# Patient Record
Sex: FEMALE | Race: WHITE
[De-identification: names, ages, dates, MRNs, and addresses within clinical notes are randomized per-mention and may not be internally consistent; named-entity substitution may affect disease eponyms.]

---

## 2017-02-09 ENCOUNTER — HOSPITAL ENCOUNTER (EMERGENCY)
Dept: HOSPITAL 25 - UCCORT | Age: 67
Discharge: HOME | End: 2017-02-09
Payer: COMMERCIAL

## 2017-02-09 VITALS — SYSTOLIC BLOOD PRESSURE: 144 MMHG | DIASTOLIC BLOOD PRESSURE: 72 MMHG

## 2017-02-09 DIAGNOSIS — Z88.5: ICD-10-CM

## 2017-02-09 DIAGNOSIS — Z98.84: ICD-10-CM

## 2017-02-09 DIAGNOSIS — Z88.8: ICD-10-CM

## 2017-02-09 DIAGNOSIS — Z96.653: ICD-10-CM

## 2017-02-09 DIAGNOSIS — Z87.891: ICD-10-CM

## 2017-02-09 DIAGNOSIS — M19.011: ICD-10-CM
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
## 2017-02-09 DIAGNOSIS — M85.88: ICD-10-CM
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
## 2017-02-09 DIAGNOSIS — S46.911A: Primary | ICD-10-CM

## 2017-02-09 PROCEDURE — 72070 X-RAY EXAM THORAC SPINE 2VWS: CPT

## 2017-02-09 PROCEDURE — G0463 HOSPITAL OUTPT CLINIC VISIT: HCPCS

## 2017-02-09 PROCEDURE — 99212 OFFICE O/P EST SF 10 MIN: CPT

## 2017-02-09 NOTE — RAD
HISTORY: Fall, pain, shoulder pain



COMPARISONS: None



VIEWS: 2, frontal internal rotation and outlet views of the right shoulder    



FINDINGS:



BONE DENSITY: Normal.

BONES: There is no displaced fracture.    

JOINTS: There is no arthropathy.    

ALIGNMENT: There is no dislocation. 

SOFT TISSUES: Unremarkable.



OTHER FINDINGS: None.



IMPRESSION: 

NO ACUTE OSSEOUS INJURY. IF SYMPTOMS PERSIST, RECOMMEND REPEAT IMAGING.

## 2017-02-09 NOTE — UC
Upper Extremity HPI





- HPI Summary


HPI Summary: 





right shoulder pain after a fall last pm at home.  States she got dizzy from 

feeling ill.  Did not hit her head.  No LOC.  No neck pain.  States she used a 

lidocaine patch and took tramadol prior to coming, that she takes for chronic 

low back pain.  States she has had a fusion of C6-T2.  





- History of Current Complaint


Chief Complaint: UCUpperExtremity


Stated Complaint: SHOULDER INJURY


Time Seen by Provider: 02/09/17 13:35


Hx Obtained From: Patient


Hx Last Menstrual Period: n/a


Onset/Duration: Sudden Onset, Lasting Hours, Still Present


Severity Initially: Moderate


Severity Currently: Moderate


Pain Intensity: 9


Pain Scale Used: 0-10 Numeric


Location Of Pain: Is Discrete @ - right shoulder, clavicle, scapula


Character: Sharp


Aggravating Factor(s): Movement


Alleviating Factor(s): Nothing


Associated Signs And Symptoms: Positive: Negative.  Negative: Bruising, Numbness

/Tingling


Related History: Dominant Hand Right





- Risk Factors


Non-Orthopedic Risk Factor: Negative


DVT Risk Factors: Negative


Septic Arthritis Risk Factor: Negative





- Allergies/Home Medications


Allergies/Adverse Reactions: 


 Allergies











Allergy/AdvReac Type Severity Reaction Status Date / Time


 


Morphine Allergy  Rash Verified 02/09/17 13:01


 


Propoxyphene Allergy  Tachycardia Verified 02/09/17 13:01





[From Darvocet-N]     


 


Hydrocodone AdvReac  See Comment Verified 02/09/17 13:01


 


Rosiglitazone [From Avandia] AdvReac  Diarrhea Verified 02/09/17 13:01














PMH/Surg Hx/FS Hx/Imm Hx


Previously Healthy: No - osteoporosis


Endocrine History Of: Reports: Diabetes


   Denies: Thyroid Disease


Cardiovascular History Of: 


   Denies: Cardiac Disorders, Hypertension


Respiratory History Of: 


   Denies: COPD, Asthma


GI/ History Of: 


   Denies: Ulcer





- Surgical History


Surgical History: Yes


Surgery Procedure, Year, and Place: b/l knee replacements 1 each, hysterectomy, 

spinal fusion x 4, left ankle; gastric bypass 2012,.  C-2 to T-2 fusion--2016, 

left shoulder sx--2016.





- Family History


Known Family History: Positive: Hypertension





- Social History


Alcohol Use: None


Substance Use Type: None


Smoking Status (MU): Former Smoker


Type: Cigarettes


Amount Used/How Often: 1 PPD


Length of Time of Smoking/Using Tobacco: On and Off 40 Years


Have You Smoked in the Last Year: No


When Did the Patient Quit Smoking/Using Tobacco: ~2003





- Immunization History


Most Recent Influenza Vaccination: 2016





Review of Systems


Constitutional: Negative


Skin: Negative


Eyes: Negative


ENT: Negative


Respiratory: Negative


Cardiovascular: Negative


Gastrointestinal: Negative


Genitourinary: Negative


Motor: Negative


Neurovascular: Negative


Musculoskeletal: Arthralgia


Neurological: Negative


Psychological: Negative


All Other Systems Reviewed And Are Negative: Yes





Physical Exam


Triage Information Reviewed: Yes


Appearance: Well-Appearing, Pain Distress, Thin


Vital Signs: 


 Initial Vital Signs











Temp  98 F   02/09/17 13:03


 


Pulse  87   02/09/17 13:03


 


Resp  20   02/09/17 13:03


 


BP  144/72   02/09/17 13:03


 


Pulse Ox  100   02/09/17 13:03











Vital Signs Reviewed: Yes


Eyes: Positive: Conjunctiva Clear


ENT: Positive: Normal ENT inspection


Dental Exam: Normal


Neck: Positive: Supple, Nontender


Respiratory: Positive: Chest non-tender, Lungs clear, Normal breath sounds, No 

respiratory distress


Cardiovascular: Positive: RRR, No Murmur, Pulses Normal, Brisk Capillary Refill


Musculoskeletal: Positive: Strength Intact, ROM Limited @ - right shoulder, 

pain on palpation right interscapular area, upper thoracic spine.  Axillary 

nerve intact.  Tender AC joint


Neurological: Positive: Alert, Muscle Tone Normal


Psychological Exam: Normal


Skin Exam: Normal





Upper Extremity Course/Dx





- Course


Course Of Treatment: xrays thoracic spine, scapula and right shoulder all neg 

for fx





- Differential Dx/Diagnosis


Provider Diagnoses: right shoulder strain s/p fall.  osteopenia





Discharge





- Discharge Plan


Condition: Stable


Disposition: HOME


Patient Education Materials:  Shoulder Sprain (ED)


Referrals: 


Yeni Dumas MD [Primary Care Provider] -

## 2017-02-09 NOTE — RAD
Indication: Back pain after fall.



2 views of the thoracic spine are reviewed. There is normal vertebral body height and

alignment. Diffuse osteopenia is noted. Disc spaces all well-preserved.



IMPRESSION: Diffuse osteopenia without fracture of the thoracic spine.

## 2017-02-09 NOTE — RAD
Indication: Right shoulder injury.



3 views of the right shoulder demonstrates AC joint arthritis. Degenerative changes of the

right glenohumeral joint is noted. No fracture is identified.



IMPRESSION: AC JOINT ARTHRITIS WITH NO EVIDENCE OF FRACTURE.

## 2017-09-04 ENCOUNTER — HOSPITAL ENCOUNTER (EMERGENCY)
Dept: HOSPITAL 25 - UCCORT | Age: 67
Discharge: HOME | End: 2017-09-04
Payer: COMMERCIAL

## 2017-09-04 VITALS — SYSTOLIC BLOOD PRESSURE: 122 MMHG | DIASTOLIC BLOOD PRESSURE: 56 MMHG

## 2017-09-04 DIAGNOSIS — R05: Primary | ICD-10-CM

## 2017-09-04 PROCEDURE — 99211 OFF/OP EST MAY X REQ PHY/QHP: CPT

## 2017-09-04 PROCEDURE — G0463 HOSPITAL OUTPT CLINIC VISIT: HCPCS

## 2017-09-04 NOTE — UC
Respiratory Complaint HPI





- HPI Summary


HPI Summary: 





pt c/o of "burning in chest" with respirations.  pt reports she had a URI last 

week that now has "traveled to her lungs" .  Pt c/o SOB with ambulation. 





- History of Current Complaint


Chief Complaint: UCGeneralIllness


Stated Complaint: CHEST CONGESTION, COUGH


Time Seen by Provider: 09/04/17 12:14


Hx Obtained From: Patient


Hx Last Menstrual Period: n/a


Pregnant?: No


Onset/Duration: Gradual Onset, Lasting Days


Severity Initially: Mild


Severity Currently: Mild


Character: Cough: Nonproductive


Aggravating Factors: Deep Breaths


Alleviating Factors: Spontaneous Resolution


Associated Signs And Symptoms: Positive: Dyspnea, URI





- Risk Factors


Pulmonary Embolism Risk Factors: Negative


Cardiac Risk Factors: Negative


Pseudomonas Risk Factors: Negative


Tuberculosis Risk Factors: Negative





- Allergies/Home Medications


Allergies/Adverse Reactions: 


 Allergies











Allergy/AdvReac Type Severity Reaction Status Date / Time


 


Morphine Allergy  Rash Verified 02/09/17 13:01


 


Propoxyphene Allergy  Tachycardia Verified 02/09/17 13:01





[From Darvocet-N]     


 


Hydrocodone AdvReac  See Comment Verified 02/09/17 13:01


 


Rosiglitazone [From Avandia] AdvReac  Diarrhea Verified 02/09/17 13:01











Home Medications: 


 Home Medications





Albuterol Sulfate [Proventil Hfa] 108 mcg IN Q4HR PRN 09/04/17 [History 

Confirmed 09/04/17]











PMH/Surg Hx/FS Hx/Imm Hx


Previously Healthy: Yes





- Surgical History


Surgical History: Yes


Surgery Procedure, Year, and Place: b/l knee replacements 1 each, hysterectomy, 

spinal fusion x 4, left ankle; gastric bypass 2012,.  C-2 to T-2 fusion--2016, 

left shoulder sx--2016., lumbar fusion this past may





- Family History


Known Family History: Positive: Hypertension





- Social History


Occupation: Retired


Lives: With Family


Alcohol Use: None


Substance Use Type: None


Smoking Status (MU): Former Smoker


Type: Cigarettes


Amount Used/How Often: 1 PPD


Length of Time of Smoking/Using Tobacco: On and Off 40 Years


Have You Smoked in the Last Year: No


When Did the Patient Quit Smoking/Using Tobacco: ~2003





- Immunization History


Most Recent Influenza Vaccination: 2016





Review of Systems


Constitutional: Negative


Skin: Negative


Eyes: Negative


ENT: Negative


Respiratory: Shortness Of Breath - with ambulation


Cardiovascular: Negative


Gastrointestinal: Negative


Genitourinary: Negative


Motor: Negative


Neurovascular: Negative


Musculoskeletal: Negative


Neurological: Negative


Psychological: Negative


All Other Systems Reviewed And Are Negative: Yes





Physical Exam


Triage Information Reviewed: Yes


Appearance: Well-Appearing


Vital Signs: 


 Initial Vital Signs











Temp  97.7 F   09/04/17 12:26


 


Pulse  89   09/04/17 12:26


 


Resp  16   09/04/17 12:26


 


BP  122/56   09/04/17 12:26


 


Pulse Ox  99   09/04/17 12:26











Vital Signs Reviewed: Yes


Eye Exam: Normal


ENT Exam: Normal


Dental Exam: Normal


Neck exam: Normal


Respiratory Exam: Normal


Cardiovascular Exam: Normal


Musculoskeletal Exam: Normal


Neurological Exam: Normal


Psychological Exam: Normal


Skin Exam: Normal





UC Diagnostic Evaluation





- Laboratory


O2 Sat by Pulse Oximetry: 99





Respiratory Course/Dx





- Differential Dx/Diagnosis


Differential Diagnosis/HQI/PQRI: Bronchitis, Other - post viral cough


Provider Diagnoses: post viral cough





Discharge





- Discharge Plan


Condition: Stable


Disposition: HOME


Prescriptions: 


methylPREDNISolone TAB* [Medrol TAB*] 4 - 8 mg PO .SEE JAY #1 jay


Patient Education Materials:  Dyspnea (ED)


Referrals: 


Yeni Dumas MD [Primary Care Provider] - If Needed (Please follow up with 

your PCP or return to clinic as needed. )


Additional Instructions: 


Please follow up with your PCP or return to clinic.  Please use the albuterol 

inhaler you were prescribed by another provider as directed.

## 2017-10-04 ENCOUNTER — HOSPITAL ENCOUNTER (EMERGENCY)
Dept: HOSPITAL 25 - UCCORT | Age: 67
Discharge: HOME | End: 2017-10-04
Payer: COMMERCIAL

## 2017-10-04 VITALS — SYSTOLIC BLOOD PRESSURE: 131 MMHG | DIASTOLIC BLOOD PRESSURE: 55 MMHG

## 2017-10-04 DIAGNOSIS — Z87.891: ICD-10-CM

## 2017-10-04 DIAGNOSIS — Z88.5: ICD-10-CM

## 2017-10-04 DIAGNOSIS — Z98.1: ICD-10-CM

## 2017-10-04 DIAGNOSIS — Z96.653: ICD-10-CM

## 2017-10-04 DIAGNOSIS — Z88.6: ICD-10-CM

## 2017-10-04 DIAGNOSIS — N39.0: Primary | ICD-10-CM

## 2017-10-04 DIAGNOSIS — Z98.84: ICD-10-CM

## 2017-10-04 DIAGNOSIS — Z88.8: ICD-10-CM

## 2017-10-04 PROCEDURE — 87086 URINE CULTURE/COLONY COUNT: CPT

## 2017-10-04 PROCEDURE — 99212 OFFICE O/P EST SF 10 MIN: CPT

## 2017-10-04 PROCEDURE — 81003 URINALYSIS AUTO W/O SCOPE: CPT

## 2017-10-04 PROCEDURE — G0463 HOSPITAL OUTPT CLINIC VISIT: HCPCS

## 2017-10-04 NOTE — UC
Abdominal Pain Female HPI





- HPI Summary


HPI Summary: 





Patient presents to the  with CC 





- History of Current Complaint


Hx Last Menstrual Period: n/a





<Rahel Garcia - Last Filed: 10/04/17 20:11>





- HPI Summary


HPI Summary: 





Dysuria and diarrhea for about 5 days. she did have nausea at one point but 

this has resolved. no fever. she has had dysuria but no fever or back pain. no 

other contacts have diarrhea. no recent hospitalization or recent antibiotics. 





- History of Current Complaint


Hx Obtained From: Patient, Family/Caretaker


Pregnant?: No


Onset/Duration: Gradual Onset, Lasting Days


Timing: Constant


Severity Initially: Moderate


Severity Currently: Moderate


Location: Diffuse


Radiates: No


Character: Aching, Cramping


Aggravating Factor(s): Other: - urination.


Alleviating Factor(s): Nothing


Associated Signs and Symptoms: Positive: Urinary Symptoms, Diarrhea - she 

denies vaginal burning or itching or swelling., Other:.  Negative: Diaphoresis, 

Fever, Cough, Chest Pain, Back Pain, Constipation, Blood in Stool, Decreased 

Appetite, Vaginal Bleeding





<Tamir Glover - Last Filed: 10/04/17 20:35>





- History of Current Complaint


Chief Complaint: UCGU


Stated Complaint: UTI


Time Seen by Provider: 10/04/17 19:50


Allergies/Adverse Reactions: 


 Allergies











Allergy/AdvReac Type Severity Reaction Status Date / Time


 


Morphine Allergy  Rash Verified 10/04/17 19:57


 


Propoxyphene Allergy  Tachycardia Verified 10/04/17 19:57





[From Darvocet-N]     


 


Hydrocodone AdvReac  See Comment Verified 10/04/17 19:57


 


Rosiglitazone [From Avandia] AdvReac  Diarrhea Verified 10/04/17 19:57











Home Medications: 


 Home Medications





Cyanocobalamin [B-12 Compliance Injection]  10/04/17 [History]


oxyCODONE/Acetamin 10/325(NF) [Percocet 10/325 (NF)] 1 tab PO Q4H 10/04/17 [

History Confirmed 10/04/17]











PMH/Surg Hx/FS Hx/Imm Hx





- Surgical History


Surgical History: Yes


Surgery Procedure, Year, and Place: b/l knee replacements 1 each, hysterectomy, 

spinal fusion x 4, left ankle; gastric bypass 2012,.  C-2 to T-2 fusion--2016, 

left shoulder sx--2016., lumbar fusion this past may





- Family History


Known Family History: Positive: Hypertension





- Social History


Alcohol Use: None


Substance Use Type: None


Smoking Status (MU): Former Smoker


Type: Cigarettes


Amount Used/How Often: 1 PPD


Length of Time of Smoking/Using Tobacco: On and Off 40 Years


Have You Smoked in the Last Year: No


When Did the Patient Quit Smoking/Using Tobacco: ~2003





- Immunization History


Most Recent Influenza Vaccination: 2016





<Rahel Garcia - Last Filed: 10/04/17 20:11>


Previously Healthy: No - back pain





- Social History


Lives: With Family





<Tamir Glover - Last Filed: 10/04/17 20:35>





Review of Systems


Gastrointestinal: Diarrhea


Genitourinary: Dysuria


All Other Systems Reviewed And Are Negative: Yes





<Tamir Glover - Last Filed: 10/04/17 20:35>





Physical Exam


Vital Signs: 


 Initial Vital Signs











Temp  98.4 F   10/04/17 19:59


 


Pulse  98   10/04/17 19:59


 


Resp  18   10/04/17 19:59


 


BP  131/55   10/04/17 19:59


 


Pulse Ox  98   10/04/17 19:59














<Rahel Garcia - Last Filed: 10/04/17 20:11>


Triage Information Reviewed: Yes


Appearance: Well-Appearing, No Pain Distress, Well-Nourished


Vital Signs: 


 Initial Vital Signs











Temp  98.4 F   10/04/17 19:59


 


Pulse  98   10/04/17 19:59


 


Resp  18   10/04/17 19:59


 


BP  131/55   10/04/17 19:59


 


Pulse Ox  98   10/04/17 19:59











Vital Signs Reviewed: Yes


Eyes: Positive: Conjunctiva Clear


ENT: Positive: Normal ENT inspection, Pharynx normal


Neck: Positive: Supple, Nontender, No Lymphadenopathy


Respiratory Exam: Normal


Respiratory: Positive: Chest non-tender, Lungs clear, Normal breath sounds, No 

respiratory distress, No accessory muscle use.  Negative: Respiratory distress


Cardiovascular: Positive: RRR, No Murmur, Pulses Normal, Brisk Capillary Refill


Abdominal Exam: Other - diffuse tenderness without guarding.


Abdomen Description: Negative: CVA Tenderness (R), CVA Tenderness (L)


Musculoskeletal: Positive: Strength Intact, ROM Intact, No Edema


Neurological: Positive: Alert, Muscle Tone Normal.  Negative: Fatigued


Psychological: Positive: Normal Response To Family, Age Appropriate Behavior


Skin: Negative: rashes





<Tamir Glover - Last Filed: 10/04/17 20:35>





Abd Pain Female Course/Dx





- Course


Course Of Treatment: we discussed supportive care and she will return for any 

worsening symptoms of any kind or if diarrhea persists.





- Differential Dx/Diagnosis


Provider Diagnoses: diarrhea.  uti





<Tamir Glover - Last Filed: 10/04/17 20:35>





Discharge





<Rahel Garcia - Last Filed: 10/04/17 20:11>





<Tamir Glover - Last Filed: 10/04/17 20:35>





- Discharge Plan


Condition: Good


Disposition: HOME


Prescriptions: 


Loperamide CAP* [Imodium CAP*] 2 mg PO Q4H PRN #20 cap


 PRN Reason: Diarrhea


Nitrofurantoin Monohyd Macro [Macrobid] 100 mg PO BID #20 cap


Patient Education Materials:  Acute Diarrhea (ED), Loperamide (By mouth), 

Urinary Tract Infection in Women (ED)


Referrals: 


Yeni Dumas MD [Primary Care Provider] - If Needed


Additional Instructions: 


Please come back if symptoms persist including diarrhea.

## 2017-10-13 ENCOUNTER — HOSPITAL ENCOUNTER (EMERGENCY)
Dept: HOSPITAL 25 - UCCORT | Age: 67
Discharge: HOME | End: 2017-10-13
Payer: COMMERCIAL

## 2017-10-13 VITALS — DIASTOLIC BLOOD PRESSURE: 65 MMHG | SYSTOLIC BLOOD PRESSURE: 138 MMHG

## 2017-10-13 DIAGNOSIS — Z96.653: ICD-10-CM

## 2017-10-13 DIAGNOSIS — N39.0: Primary | ICD-10-CM

## 2017-10-13 DIAGNOSIS — Z87.891: ICD-10-CM

## 2017-10-13 DIAGNOSIS — Z88.5: ICD-10-CM

## 2017-10-13 DIAGNOSIS — Z98.84: ICD-10-CM

## 2017-10-13 DIAGNOSIS — Z90.710: ICD-10-CM

## 2017-10-13 PROCEDURE — G0463 HOSPITAL OUTPT CLINIC VISIT: HCPCS

## 2017-10-13 PROCEDURE — 81003 URINALYSIS AUTO W/O SCOPE: CPT

## 2017-10-13 PROCEDURE — 99212 OFFICE O/P EST SF 10 MIN: CPT

## 2017-10-13 PROCEDURE — 87086 URINE CULTURE/COLONY COUNT: CPT

## 2017-10-13 NOTE — UC
Complaint Female HPI





- HPI Summary


HPI Summary: 





SEEN 10/4/17, DIAGNOSED WITH UTI PRESCRIBED MACROBID. FELT BETTER FOR A COUPL 

EOF DAYS BUT NOW CHILLS, FREQUENCY, URGENCY, AND LOW BACK PAIN HAVE WORSENED. 





- History Of Current Complaint


Chief Complaint: UCGU


Stated Complaint: UTI - NOT IMPROVING


Time Seen by Provider: 10/13/17 16:47


Hx Obtained From: Patient


Hx Last Menstrual Period: n/a


Onset/Duration: Sudden Onset, Lasting Days, Still Present


Timing: Intermittent, Lasting Days


Severity Initially: Mild


Severity Currently: Moderate


Character: Dull, Burning, Cramping


Aggravating Factor(s): Urination


Associated Signs And Symptoms: Positive: Back Pain.  Negative: Vaginal Bleeding/

Discharge, Vaginal Discharge, Nausea, Vomiting(# Of Episodes =)





- Risk Factors


Ectopic Pregnancy Risk Factor: Negative


Ovarian Torsion Risk Factor: Negative





- Allergies/Home Medications


Allergies/Adverse Reactions: 


 Allergies











Allergy/AdvReac Type Severity Reaction Status Date / Time


 


Morphine Allergy  Rash Verified 10/13/17 16:34


 


Propoxyphene Allergy  Tachycardia Verified 10/13/17 16:34





[From Darvocet-N]     


 


Hydrocodone AdvReac  See Comment Verified 10/13/17 16:34


 


Rosiglitazone [From Avandia] AdvReac  Diarrhea Verified 10/13/17 16:34














PMH/Surg Hx/FS Hx/Imm Hx


Previously Healthy: Yes





- Surgical History


Surgical History: Yes


Surgery Procedure, Year, and Place: b/l knee replacements 1 each, hysterectomy, 

spinal fusion x 4, left ankle; gastric bypass 2012,.  C-2 to T-2 fusion--2016, 

left shoulder sx--2016., lumbar fusion this past may





- Family History


Known Family History: Positive: Hypertension





- Social History


Occupation: Employed Full-time


Lives: With Family


Alcohol Use: None


Substance Use Type: None


Smoking Status (MU): Former Smoker


Type: Cigarettes


Amount Used/How Often: 1 PPD


Length of Time of Smoking/Using Tobacco: On and Off 40 Years


Have You Smoked in the Last Year: No


When Did the Patient Quit Smoking/Using Tobacco: ~2003





- Immunization History


Most Recent Influenza Vaccination: 2016





Review of Systems


Constitutional: Chills


Skin: Negative


Eyes: Negative


ENT: Negative


Respiratory: Negative


Cardiovascular: Negative


Gastrointestinal: Negative


Genitourinary: Dysuria


Motor: Negative


Neurovascular: Negative


Musculoskeletal: Negative


Neurological: Negative


Psychological: Negative


Is Patient Immunocompromised?: No


All Other Systems Reviewed And Are Negative: Yes





Physical Exam


Triage Information Reviewed: Yes


Appearance: Well-Appearing, No Pain Distress, Well-Nourished


Vital Signs: 


 Initial Vital Signs











Temp  98.8 F   10/13/17 16:35


 


Pulse  88   10/13/17 16:35


 


Resp  18   10/13/17 16:35


 


BP  138/65   10/13/17 16:35


 


Pulse Ox  100   10/13/17 16:35











Vital Signs Reviewed: Yes


Eye Exam: Normal


ENT Exam: Normal


ENT: Positive: Normal ENT inspection


Dental Exam: Normal


Neck exam: Normal


Neck: Positive: Supple, Nontender, No Lymphadenopathy


Respiratory Exam: Normal


Respiratory: Positive: Chest non-tender, Lungs clear, Normal breath sounds, No 

respiratory distress


Cardiovascular Exam: Normal


Cardiovascular: Positive: RRR, No Murmur, Pulses Normal


Abdomen Description: Positive: Nontender, No Organomegaly, CVA Tenderness (R)


Musculoskeletal Exam: Normal


Neurological Exam: Normal


Psychological Exam: Normal


Skin Exam: Normal





 Complaint Female Dx





- Differential Dx/Diagnosis


Differential Diagnosis/HQI/PQRI: Cervicitis, Renal Colic, Urinary Tract 

Infection, Other - PYELO


Provider Diagnoses: URINARY TRACT INFECTION





Discharge





- Discharge Plan


Condition: Stable


Disposition: HOME


Prescriptions: 


Phenazopyridine TAB* [Pyridium 100 mg TAB*] 100 mg PO TID PRN #15 tab


 PRN Reason: Pain


Sulfamethox/Trimethoprim DS* [Bactrim /160 TAB*] 1 tab PO BID #20 tab


Patient Education Materials:  Urinary Tract Infection in Women (ED)


Referrals: 


Yeni Dumas MD [Primary Care Provider] -

## 2017-10-16 NOTE — ED
Progress





- Progress Note


Progress Note: 





ucx (-). stop abx.if worse er/pcp. 





Course/Dx





- Diagnoses


Provider Diagnoses: 


 Dysuria

## 2018-02-17 ENCOUNTER — HOSPITAL ENCOUNTER (EMERGENCY)
Dept: HOSPITAL 25 - UCCORT | Age: 68
Discharge: HOME | End: 2018-02-17
Payer: COMMERCIAL

## 2018-02-17 VITALS — SYSTOLIC BLOOD PRESSURE: 119 MMHG | DIASTOLIC BLOOD PRESSURE: 65 MMHG

## 2018-02-17 DIAGNOSIS — E11.9: ICD-10-CM

## 2018-02-17 DIAGNOSIS — Z87.891: ICD-10-CM

## 2018-02-17 DIAGNOSIS — J06.9: Primary | ICD-10-CM

## 2018-02-17 PROCEDURE — 99211 OFF/OP EST MAY X REQ PHY/QHP: CPT

## 2018-02-17 PROCEDURE — 87502 INFLUENZA DNA AMP PROBE: CPT

## 2018-02-17 PROCEDURE — G0463 HOSPITAL OUTPT CLINIC VISIT: HCPCS

## 2018-02-17 NOTE — UC
Respiratory Complaint HPI





- HPI Summary


HPI Summary: 





cough x 1 day


non-productive,


+ fever, chills, body aches


nasal congestion,  no sore throat 





- History of Current Complaint


Chief Complaint: UCGeneralIllness


Stated Complaint: COUGH,ACHY,CHEST AMINAH


Time Seen by Provider: 02/17/18 10:32


Hx Obtained From: Patient


Hx Last Menstrual Period: n/a


Onset/Duration: Gradual Onset, Lasting Days - 1, Still Present


Severity Initially: Moderate


Severity Currently: Moderate


Pain Intensity: 7


Character: Cough: Nonproductive


Aggravating Factors: Exertion, Deep Breaths


Alleviating Factors: Nothing


Associated Signs And Symptoms: Positive: Fever, Chills, URI, Nasal Congestion.  

Negative: Dyspnea, Wheezing, Hemoptysis, Dizziness, Calf Pain, Calf Swelling





- Allergies/Home Medications


Allergies/Adverse Reactions: 


 Allergies











Allergy/AdvReac Type Severity Reaction Status Date / Time


 


acetaminophen Allergy  Diarrhea Verified 02/17/18 10:21





[From Darvocet-N]     


 


hydrocodone Allergy  Rash Verified 02/17/18 10:21


 


morphine Allergy  Rash Verified 02/17/18 10:21


 


propoxyphene Allergy  Diarrhea Verified 02/17/18 10:21





[From Darvocet-N]     














PMH/Surg Hx/FS Hx/Imm Hx


Endocrine History: Diabetes





- Surgical History


Surgical History: Yes


Surgery Procedure, Year, and Place: b/l knee replacements 1 each, hysterectomy, 

spinal fusion x 4, left ankle; gastric bypass 2012,.  C-2 to T-2 fusion--2016, 

left shoulder sx--2016., lumbar fusion this past may





- Family History


Known Family History: Positive: Hypertension





- Social History


Alcohol Use: None


Substance Use Type: None


Smoking Status (MU): Former Smoker


Type: Cigarettes


Amount Used/How Often: 1 PPD


Length of Time of Smoking/Using Tobacco: On and Off 40 Years


Have You Smoked in the Last Year: No


When Did the Patient Quit Smoking/Using Tobacco: ~2003





- Immunization History


Most Recent Influenza Vaccination: 2016





Review of Systems


Constitutional: Fever, Chills, Fatigue


Skin: Negative


Eyes: Negative


ENT: Nasal Discharge


Respiratory: Cough


Cardiovascular: Negative


Gastrointestinal: Negative


Genitourinary: Negative


Is Patient Immunocompromised?: No


All Other Systems Reviewed And Are Negative: Yes





Physical Exam


Triage Information Reviewed: Yes


Appearance: Well-Appearing, No Pain Distress, Well-Nourished


Vital Signs: 


 Initial Vital Signs











Temp  100.6 F   02/17/18 10:19


 


Pulse  96   02/17/18 10:19


 


Resp  18   02/17/18 10:19


 


BP  119/65   02/17/18 10:19


 


Pulse Ox  97   02/17/18 10:19











Vital Signs Reviewed: Yes


Eyes: Positive: Conjunctiva Clear


ENT: Positive: Normal ENT inspection, Hearing grossly normal, Pharynx normal, 

Nasal drainage.  Negative: TM bulging, TM dull, TM red


Neck: Positive: Supple, Nontender, No Lymphadenopathy


Respiratory: Positive: Chest non-tender, Lungs clear, Normal breath sounds


Cardiovascular: Positive: RRR, No Murmur, Pulses Normal


Abdominal Exam: Normal


Abdomen Description: Positive: Nontender, Soft.  Negative: CVA Tenderness (R), 

CVA Tenderness (L), Distended, Guarding


Bowel Sounds: Positive: Present


Musculoskeletal Exam: Normal


Musculoskeletal: Positive: Strength Intact, ROM Intact, No Edema


Neurological: Positive: Alert





UC Diagnostic Evaluation





- Laboratory


O2 Sat by Pulse Oximetry: 97


Diagnostic Studies Comment: negative rapid Flu





Respiratory Course/Dx





- Differential Dx/Diagnosis


Provider Diagnoses: uri





Discharge





- Discharge Plan


Condition: Stable


Disposition: HOME


Patient Education Materials:  Upper Respiratory Infection (DC)


Referrals: 


Yeni Dumas MD [Primary Care Provider] - If Needed

## 2018-04-16 ENCOUNTER — HOSPITAL ENCOUNTER (EMERGENCY)
Dept: HOSPITAL 25 - UCCORT | Age: 68
Discharge: HOME | End: 2018-04-16
Payer: COMMERCIAL

## 2018-04-16 VITALS — SYSTOLIC BLOOD PRESSURE: 113 MMHG | DIASTOLIC BLOOD PRESSURE: 74 MMHG

## 2018-04-16 DIAGNOSIS — Z96.653: ICD-10-CM
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
## 2018-04-16 DIAGNOSIS — M43.6: ICD-10-CM

## 2018-04-16 DIAGNOSIS — M54.2: ICD-10-CM

## 2018-04-16 DIAGNOSIS — I51.7: ICD-10-CM

## 2018-04-16 DIAGNOSIS — Z88.5: ICD-10-CM
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
## 2018-04-16 DIAGNOSIS — Z87.891: ICD-10-CM

## 2018-04-16 DIAGNOSIS — S22.31XA: Primary | ICD-10-CM
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
## 2018-04-16 PROCEDURE — 99211 OFF/OP EST MAY X REQ PHY/QHP: CPT

## 2018-04-16 PROCEDURE — G0463 HOSPITAL OUTPT CLINIC VISIT: HCPCS

## 2018-04-16 NOTE — RAD
INDICATION:  Right rib injury.



COMPARISON:  Comparison is made with a prior chest x-ray study from October 10, 2016.



TECHNIQUE:  4 views of the right ribs and dual-energy PA views of the chest were obtained.



FINDINGS:  There is a nondisplaced fracture of the right lateral seventh rib which is

unchanged from the prior study. There is also a nondisplaced fracture of the right lateral

eighth rib which appears new possibly representing an acute fracture.



The heart is moderately enlarged and unchanged from the prior exam. The lungs are

hyperinflated and clear. No pleural effusion or pneumothorax is seen.



IMPRESSION: 

1. NONDISPLACED FRACTURE OF THE RIGHT LATERAL EIGHTH RIB NEW FROM THE PRIOR STUDY.

2. CARDIOMEGALY, UNCHANGED.

## 2018-04-16 NOTE — UC
Truncal Trauma HPI





- HPI Summary


HPI Summary: 





Pt presents with c/o right side rib pain after falling 2 weeks ago and then 

fell two days ago. Both falls were from standing height and denies hemoptysis 

or SOB.  Also, c/o right side neck pain and stiffness. Has history of neck and 

back surgery. 





- History Of Current Complaint


Hx Obtained From: Patient


Hx Last Menstrual Period: n/a


Pregnant?: No


Onset/Duration: Sudden Onset, Lasting Weeks


Onset Of Pain: Immediate


Severity Initially: Moderate


Severity Currently: Moderate


Pain Intensity: 8


Mechanism Of Injury: Fall From A Standing Position


Aggravating Factor(s): Movement, Deep Breathing, Cough


Alleviating factor(s): Rest


Associated Signs And Symptoms: Positive: Negative





<Zeina Martinez NP - Last Filed: 04/16/18 17:12>





<Helene Dumas - Last Filed: 04/16/18 18:44>





- History Of Current Complaint


Chief Complaint: UCChestPain


Stated Complaint: (R) SIDE RIB PAIN S/P FALL





- Allergies/Home Medications


Allergies/Adverse Reactions: 


 Allergies











Allergy/AdvReac Type Severity Reaction Status Date / Time


 


morphine Allergy  Rash Verified 04/16/18 16:00


 


propoxyphene Allergy  Diarrhea Verified 04/16/18 16:00





[From Darvocet-N]     











Home Medications: 


 Home Medications





Acetaminophen [Tylenol Arthritis] 2 tab PO TID PRN 04/16/18 [History Confirmed 

04/16/18]











PMH/Surg Hx/FS Hx/Imm Hx


Previously Healthy: Yes





- Surgical History


Surgical History: Yes


Surgery Procedure, Year, and Place: b/l knee replacements 1 each, hysterectomy, 

spinal fusion x 5, left ankle; gastric bypass 2012,.  C-2 to T-2 fusion--2016, 

left shoulder sx--2016., lumbar fusion this past may





- Family History


Known Family History: Positive: Hypertension





- Social History


Occupation: Retired


Lives: With Family


Alcohol Use: None


Substance Use Type: None


Smoking Status (MU): Former Smoker


Type: Cigarettes


Amount Used/How Often: 1 PPD


Length of Time of Smoking/Using Tobacco: On and Off 40 Years


Have You Smoked in the Last Year: No


When Did the Patient Quit Smoking/Using Tobacco: ~2003





- Immunization History


Most Recent Influenza Vaccination: 2016





<Zeina Martinez NP - Last Filed: 04/16/18 17:12>





Review of Systems


Constitutional: Negative


Skin: Negative


Eyes: Negative


ENT: Negative


Respiratory: Negative


Cardiovascular: Negative


Gastrointestinal: Negative


Genitourinary: Negative


Motor: Decreased ROM - neck, rib cage


Neurovascular: Negative


Musculoskeletal: Arthralgia - ribs 7-10


Neurological: Negative


Psychological: Negative


Is Patient Immunocompromised?: No


All Other Systems Reviewed And Are Negative: Yes





<Zeina Martinez NP Last Filed: 04/16/18 17:12>





Physical Exam


Triage Information Reviewed: Yes


Appearance: Pain Distress


Vital Signs: 


 Initial Vital Signs











Temp  98.2 F   04/16/18 16:04


 


Pulse  89   04/16/18 16:04


 


Resp  16   04/16/18 16:04


 


BP  113/74   04/16/18 16:04


 


Pulse Ox  97   04/16/18 16:04











Vital Signs Reviewed: Yes


Eye Exam: Normal


ENT Exam: Normal


Neck exam: Other - neck stiffnes, decreased ROM


Respiratory Exam: Normal


Cardiovascular Exam: Normal


Musculoskeletal Exam: Other


Musculoskeletal: Positive: ROM Limited @ - neck,, Other: - trigger point 

tenderness with palpable "knot" right medial upper trapezious


Neurological Exam: Normal


Psychological Exam: Normal


Skin Exam: Normal





<Zeina Martinez NP Last Filed: 04/16/18 17:12>


Vital Signs: 


 Initial Vital Signs











Temp  98.2 F   04/16/18 16:04


 


Pulse  89   04/16/18 16:04


 


Resp  16   04/16/18 16:04


 


BP  113/74   04/16/18 16:04


 


Pulse Ox  97   04/16/18 16:04














<Helene Dumas - Last Filed: 04/16/18 18:44>





Diagnostics





- Radiology


  ** No standard instances


Radiology Interpretation Completed By: Radiologist -  FINDINGS: There is a 

nondisplaced fracture of the right lateral seventh rib which is unchanged from 

the prior study. There is also a nondisplaced fracture of the right lateral 

eighth rib which appears new possibly representing an acute fracture.  The 

heart is moderately enlarged and unchanged from the prior exam. The lungs are 

hyperinflated and clear. No pleural effusion or pneumothorax is seen.  

IMPRESSION: 1. NONDISPLACED FRACTURE OF THE RIGHT LATERAL EIGHTH RIB NEW FROM 

THE PRIOR STUDY. 2. CARDIOMEGALY, UNCHANGED.





<Zeina Martinez NP - Last Filed: 04/16/18 17:12>





Truncal Trauma Course/Dx





- Course


Course Of Treatment: FINDINGS: There is a nondisplaced fracture of the right 

lateral seventh rib which is.  unchanged from the prior study. There is also a 

nondisplaced fracture of the right lateral.  eighth rib which appears new 

possibly representing an acute fracture.  The heart is moderately enlarged and 

unchanged from the prior exam. The lungs are.  hyperinflated and clear. No 

pleural effusion or pneumothorax is seen.  IMPRESSION:  1. NONDISPLACED 

FRACTURE OF THE RIGHT LATERAL EIGHTH RIB NEW FROM THE PRIOR STUDY.  2. 

CARDIOMEGALY, UNCHANGED.





- Differential Dx/Diagnosis


Differential Diagnosis/HQI/PQRI: Rib Fracture, Other - trigger point


Provider Diagnoses: Rib fracture 8th right right side.  trigger point 

tenderness right upper trapezious





<Zeina Martinez NP - Last Filed: 04/16/18 17:12>





Discharge





- Sign-Out/Discharge


Documenting (check all that apply): Discharge





- Billing Disposition and Condition


Condition: STABLE


Disposition: HOME





<Zeina Martinez NP - Last Filed: 04/16/18 17:12>





- Billing Disposition and Condition


Condition: STABLE


Disposition: HOME





<Helene Dumas - Last Filed: 04/16/18 18:44>





- Discharge Plan


Condition: Stable


Disposition: HOME


Patient Education Materials:  Rib Fracture (ED), Trigger Point Pain (ED)


Referrals: 


Yeni Dumas MD [Primary Care Provider] - If Needed





Attestation Statement


User Type: Provider - I was available for consult. This patient was seen by the 

LATASHA. The patient was not presented to, seen by, or examined by me. -Dru





<Helene Dumas - Last Filed: 04/16/18 18:44>

## 2018-05-14 ENCOUNTER — HOSPITAL ENCOUNTER (EMERGENCY)
Dept: HOSPITAL 25 - UCCORT | Age: 68
Discharge: HOME | End: 2018-05-14
Payer: COMMERCIAL

## 2018-05-14 VITALS — DIASTOLIC BLOOD PRESSURE: 70 MMHG | SYSTOLIC BLOOD PRESSURE: 131 MMHG

## 2018-05-14 DIAGNOSIS — L03.211: Primary | ICD-10-CM

## 2018-05-14 DIAGNOSIS — Z88.5: ICD-10-CM

## 2018-05-14 DIAGNOSIS — Z87.891: ICD-10-CM

## 2018-05-14 DIAGNOSIS — Z88.1: ICD-10-CM

## 2018-05-14 PROCEDURE — G0463 HOSPITAL OUTPT CLINIC VISIT: HCPCS

## 2018-05-14 PROCEDURE — 99212 OFFICE O/P EST SF 10 MIN: CPT

## 2018-05-14 NOTE — UC
Skin Complaint HPI





- History of Current Complaint


Chief Complaint: UCSkin


Time Seen by Provider: 05/14/18 15:01


Stated Complaint: FACIAL SKIN COMPLAINT


Hx Obtained From: Patient


Hx Last Menstrual Period: n/a


Skin Exposure Onset/Duration: Days Ago


Onset Severity: Mild


Current Severity: Mild


Pain Intensity: 0


Pain Scale Used: 0-10 Numeric


Location: Face


Aggravating Factor(s): Nothing


Alleviating Factor(s): Nothing





- Allergy/Home Medications


Allergies/Adverse Reactions: 


 Allergies











Allergy/AdvReac Type Severity Reaction Status Date / Time


 


morphine Allergy  Rash Verified 05/14/18 15:00


 


sulfamethoxazole Allergy  Rash Verified 05/14/18 15:22





[From Bactrim]     


 


trimethoprim [From Bactrim] Allergy  Rash Verified 05/14/18 15:22


 


propoxyphene AdvReac  Diarrhea Verified 05/14/18 15:00





[From Darvocet-N]     














Review of Systems


Skin: Other


All Other Systems Reviewed And Are Negative: Yes





PMH/Surg Hx/FS Hx/Imm Hx


Previously Healthy: Yes





- Surgical History


Surgical History: Yes


Surgery Procedure, Year, and Place: b/l knee replacements 1 each, hysterectomy, 

spinal fusion x 5, left ankle; gastric bypass 2012,.  C-2 to T-2 fusion--2016, 

left shoulder sx--2016., lumbar fusion this past may





- Family History


Known Family History: Positive: Hypertension





- Social History


Occupation: Employed Full-time


Alcohol Use: None


Substance Use Type: None


Smoking Status (MU): Former Smoker


Type: Cigarettes


Amount Used/How Often: 1 PPD


Length of Time of Smoking/Using Tobacco: On and Off 40 Years


Have You Smoked in the Last Year: No


When Did the Patient Quit Smoking/Using Tobacco: ~2003





- Immunization History


Most Recent Influenza Vaccination: 2016





Physical Exam


Triage Information Reviewed: Yes


Appearance: Well-Appearing, No Pain Distress, Well-Nourished


Vital Signs: 


 Initial Vital Signs











Temp  98.2 F   05/14/18 14:55


 


Pulse  91   05/14/18 14:55


 


Resp  16   05/14/18 14:55


 


BP  131/70   05/14/18 14:55


 


Pulse Ox  100   05/14/18 14:55











Vital Signs Reviewed: Yes


Eye Exam: Normal


Eyes: Positive: Conjunctiva Clear


ENT: Positive: Hearing grossly normal, TMs normal


Dental Exam: Normal


Neck exam: Normal


Neck: Positive: Supple, Nontender, No Lymphadenopathy


Respiratory Exam: Normal


Respiratory: Positive: Chest non-tender, Normal breath sounds, No respiratory 

distress, No accessory muscle use


Cardiovascular Exam: Normal


Cardiovascular: Positive: RRR, No Murmur


Musculoskeletal Exam: Normal - right upper, lateral cheek pt with 2x2 cm 

erythema, indurated  no fluctuance. small scab. No drainage. mild warmth, well 

demarcated  no red streaking, no ear involvement.


Neurological Exam: Normal


Neurological: Positive: Alert


Psychological Exam: Normal


Skin: Positive: Other





Course/Dx





- Course


Course Of Treatment: Pt with area of inflammed follicle and surrounding 

cellulitis on right upper cheek. No drainage no fluctance - no abscess to I + 

D.  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  Will start Doxy.  warm 

soaks.  return precautions.  pt to monitor erythema.  return precuations.  pt 

in agreement [facility] plan





- Diagnoses


Provider Diagnoses: facial cellulitis





Discharge





- Sign-Out/Discharge


Documenting (check all that apply): Discharge/Admit/Transfer





- Discharge Plan


Condition: Stable


Disposition: HOME


Prescriptions: 


DOXYcycline CAP(*) [DOXYcycline 100MG CAP(*)] 100 mg PO BID #20 cap


Patient Education Materials:  Cellulitis (ED)


Referrals: 


Yeni Dumas MD [Primary Care Provider] - 


Additional Instructions: 


- Apply warm, wet soaks to your wound for 15 minutes, 2-3 times a day


- Take antibiotics as prescribed until gone


- Erythema may increase for the first 24 hours - if it increases after24 hours, 

you develop a fever, increased pain, red streaking or any other concerns you 

should go to the emergency department for additional treatment. 








- Billing Disposition and Condition


Condition: STABLE


Disposition: HOME

## 2019-06-03 ENCOUNTER — HOSPITAL ENCOUNTER (EMERGENCY)
Dept: HOSPITAL 25 - UCCORT | Age: 69
Discharge: HOME | End: 2019-06-03
Payer: COMMERCIAL

## 2019-06-03 VITALS — DIASTOLIC BLOOD PRESSURE: 72 MMHG | SYSTOLIC BLOOD PRESSURE: 144 MMHG

## 2019-06-03 DIAGNOSIS — R07.89: Primary | ICD-10-CM

## 2019-06-03 DIAGNOSIS — I51.7: ICD-10-CM

## 2019-06-03 DIAGNOSIS — Z87.891: ICD-10-CM

## 2019-06-03 PROCEDURE — 71046 X-RAY EXAM CHEST 2 VIEWS: CPT

## 2019-06-03 PROCEDURE — G0463 HOSPITAL OUTPT CLINIC VISIT: HCPCS

## 2019-06-03 PROCEDURE — 81003 URINALYSIS AUTO W/O SCOPE: CPT

## 2019-06-03 PROCEDURE — 99212 OFFICE O/P EST SF 10 MIN: CPT

## 2019-11-15 NOTE — UC
General HPI





- HPI Summary


HPI Summary: 





pt presents for evaluation of her left rib pain.  she states she fell last 

week.  it was a mechanical fall.  she states that today the pain got worse.  

she drove herself here to the .  She takes tylenol on occasion for pain.  she 

denies any further falls or injuries.  





- History of Current Complaint


Chief Complaint: UCTrauma


Stated Complaint: RIB PAIN S/P FALL 1 WEEK AGO


Hx Obtained From: Patient


Hx Last Menstrual Period: n/a


Onset/Duration: Sudden Onset


Onset Severity: Moderate


Current Severity: Moderate


Pain Intensity: 8





- Allergy/Home Medications


Allergies/Adverse Reactions: 


 Allergies











Allergy/AdvReac Type Severity Reaction Status Date / Time


 


morphine Allergy  Rash Verified 06/03/19 19:12


 


sulfamethoxazole Allergy  Rash Verified 06/03/19 19:12





[From Bactrim]     


 


trimethoprim [From Bactrim] Allergy  Rash Verified 06/03/19 19:12


 


propoxyphene AdvReac  Diarrhea Verified 06/03/19 19:12





[From Darvocet-N]     














PMH/Surg Hx/FS Hx/Imm Hx


Previously Healthy: Yes





- Surgical History


Surgical History: Yes


Surgery Procedure, Year, and Place: b/l knee replacements 1 each, hysterectomy, 

spinal fusion x 5, left ankle; gastric bypass 2012,.  C-2 to T-2 fusion--2016, 

left shoulder sx--2016., lumbar fusion this past may.  Right knee replacement 

after shattered bone injury.  Repair of fx'd right femur





- Family History


Known Family History: Positive: Hypertension





- Social History


Alcohol Use: None


Substance Use Type: None


Smoking Status (MU): Former Smoker


Type: Cigarettes


Amount Used/How Often: 1 PPD


Length of Time of Smoking/Using Tobacco: On and Off 40 Years


Have You Smoked in the Last Year: No


When Did the Patient Quit Smoking/Using Tobacco: ~2003





- Immunization History


Most Recent Influenza Vaccination: 2016





Review of Systems


All Other Systems Reviewed And Are Negative: No


Constitutional: Positive: Negative


Skin: Positive: Negative


Eyes: Positive: Negative


ENT: Positive: Negative


Respiratory: Positive: Shortness Of Breath - when she gets a sharp pain to her 

left rib cage area


Gastrointestinal: Positive: Negative


Genitourinary: Negative: Dysuria, Hematuria, Frequency


Motor: Negative: Decreased ROM, Weakness


Neurovascular: Positive: Negative


Musculoskeletal: Positive: Other: - chest wall pain


Neurological: Negative: Headache


Psychological: Negative: Negative


Is Patient Immunocompromised?: No





Physical Exam


Triage Information Reviewed: Yes


Appearance: Well-Appearing, No Pain Distress, Well-Nourished


Vital Signs: 


 Initial Vital Signs











Temp  98.2 F   06/03/19 19:16


 


Pulse  97   06/03/19 19:16


 


Resp  16   06/03/19 19:16


 


BP  155/55   06/03/19 19:16


 


Pulse Ox  100   06/03/19 19:16











Vital Signs Reviewed: Yes


Eyes: Positive: Conjunctiva Clear


ENT: Positive: Hearing grossly normal, Pharynx normal


Neck exam: Normal


Neck: Positive: Supple, Nontender


Respiratory Exam: Normal


Respiratory: Positive: Chest non-tender, Lungs clear, Normal breath sounds, No 

respiratory distress


Cardiovascular: Positive: RRR, No Murmur, Pulses Normal


Abdomen Description: Positive: Nontender, Soft


Bowel Sounds: Positive: Present


Musculoskeletal Exam: Other - pain to palpation to left chest wall extending to 

axilla


Neurological: Positive: Alert


Psychological Exam: Normal


Skin Exam: Normal





Course/Dx





- Course


Course Of Treatment: 





chest xray and rib viewed to left area showed no obvious frx, however, with pt'

s significant pain and her age, I was concerned that she may have a frx or 

further injuries.  I was concerned that pt may have more extensive injuries 

than 1 rib frx.  I encouraged pt to go to the ED for further evaluation and a 

CT of chest and abdomen to evaluate her spleen.  pt deferred. she states she is 

so tired and wants to go home.  we discussed risks and benefits.  she still 

deferred.  at that point, I sent her xrays to [facility].  no obvious frx noted on 

chest xray.  pt discharged.  I strongly encouraged her to go to her pcp 

tomorrow or to go to the ED tomorrow if she does not feel better.  





- Diagnoses


Provider Diagnosis: 


 Left-sided chest wall pain








Discharge





- Sign-Out/Discharge


Documenting (check all that apply): Patient Departure


All imaging exams completed and their final reports reviewed: Yes





- Discharge Plan


Condition: Stable


Disposition: HOME


Patient Education Materials:  Chest Pain (ED), Chest Wall Pain (ED)


Referrals: 


Yeni Dumas MD [Primary Care Provider] - 


Additional Instructions: 


Please follow up with your primary care physician tomorrow.  It is important to 

take tylenol or motrin for pain.  If you are worse or not better, PLEASE go to 

the emergency department.  





- Billing Disposition and Condition


Condition: STABLE


Disposition: Home Color consistent with ethnicity/race, warm, dry intact, resilient.